# Patient Record
Sex: FEMALE | Race: ASIAN | Employment: UNEMPLOYED | URBAN - METROPOLITAN AREA
[De-identification: names, ages, dates, MRNs, and addresses within clinical notes are randomized per-mention and may not be internally consistent; named-entity substitution may affect disease eponyms.]

---

## 2019-01-07 ENCOUNTER — HOSPITAL ENCOUNTER (EMERGENCY)
Age: 34
Discharge: HOME | End: 2019-01-07
Attending: EMERGENCY MEDICINE
Payer: MEDICAID

## 2019-01-07 VITALS
HEIGHT: 65 IN | WEIGHT: 140 LBS | RESPIRATION RATE: 16 BRPM | DIASTOLIC BLOOD PRESSURE: 74 MMHG | SYSTOLIC BLOOD PRESSURE: 111 MMHG | HEART RATE: 65 BPM | TEMPERATURE: 97.9 F | BODY MASS INDEX: 23.32 KG/M2 | OXYGEN SATURATION: 100 %

## 2019-01-07 DIAGNOSIS — H66.90 ACUTE OTITIS MEDIA, UNSPECIFIED OTITIS MEDIA TYPE: ICD-10-CM

## 2019-01-07 DIAGNOSIS — H57.89 EYE SWELLING: ICD-10-CM

## 2019-01-07 DIAGNOSIS — H00.011 HORDEOLUM EXTERNUM OF RIGHT UPPER EYELID: Primary | ICD-10-CM

## 2019-01-07 PROCEDURE — 99282 EMERGENCY DEPT VISIT SF MDM: CPT

## 2019-01-07 RX ORDER — AZITHROMYCIN 250 MG/1
TABLET, FILM COATED ORAL
Qty: 6 TAB | Refills: 0 | Status: SHIPPED | OUTPATIENT
Start: 2019-01-07 | End: 2019-01-12

## 2019-01-07 RX ORDER — ERYTHROMYCIN 5 MG/G
OINTMENT OPHTHALMIC
Qty: 1 TUBE | Refills: 0 | Status: SHIPPED | OUTPATIENT
Start: 2019-01-07 | End: 2019-01-14

## 2019-01-07 NOTE — ED PROVIDER NOTES
EMERGENCY DEPARTMENT HISTORY AND PHYSICAL EXAM 
 
Date: 1/7/2019 Patient Name: Oli Peacock History of Presenting Illness Chief Complaint Patient presents with  Eye Swelling History Provided By: Patient and Patient's  Chief Complaint: eyelid swelling Duration: 1 Days Timing:  Acute Location: right, upper Modifying Factors: z-pack Associated Symptoms: ear pain Additional History (Context):  
10:57 AM 
Oli Peacock is a 34 y.o. female who presents to the emergency department C/O right upper eyelid swelling onset 1 day ago. Associated symptoms include ear pain. Used daughter's left over z-pack. Denies contact usage. Recent cold symptoms including cough last week; sx resolved. Sick contact with  diagnosed with bronchitis. Pt denies eye drainage, visual disturbance, fever, and any other Sx or complaints. PCP: Julius Garcia MD 
 
 
 
Past History Past Medical History: 
Past Medical History:  
Diagnosis Date  Asthma Past Surgical History: 
History reviewed. No pertinent surgical history. Family History: 
History reviewed. No pertinent family history. Social History: 
Social History Tobacco Use  Smoking status: Not on file Substance Use Topics  Alcohol use: Not on file  Drug use: Not on file Allergies: Allergies Allergen Reactions  Motrin [Ibuprofen] Anaphylaxis Review of Systems Review of Systems Constitutional: Negative for fever. HENT: Positive for ear pain. Eyes: Negative for discharge and visual disturbance. (+)  right upper eyelid swelling All other systems reviewed and are negative. Physical Exam  
 
Vitals:  
 01/07/19 1055 BP: 111/74 Pulse: 65 Resp: 16 Temp: 97.9 °F (36.6 °C) SpO2: 100% Weight: 63.5 kg (140 lb) Height: 5' 5\" (1.651 m) Physical Exam  
Constitutional: She appears well-developed and well-nourished.   
HENT:  
 Head: Normocephalic and atraumatic. Right Ear: Hearing normal.  
Left Ear: Hearing and tympanic membrane normal.  
Ears: 
 
Nose: Nose normal.  
Mouth/Throat:  
 
 
Eyes: Conjunctivae and EOM are normal. Pupils are equal, round, and reactive to light.  
hordeolum and mild swelling to the right upper eyelid, no ocular involvement, mild TTP, no surrounding swelling or erythema Neck: Neck supple. Nursing note and vitals reviewed. Diagnostic Study Results Labs - No results found for this or any previous visit (from the past 12 hour(s)). Radiologic Studies - No orders to display CT Results  (Last 48 hours) None CXR Results  (Last 48 hours) None Medications given in the ED- Medications - No data to display Medical Decision Making I am the first provider for this patient. I reviewed the vital signs, available nursing notes, past medical history, past surgical history, family history and social history. Vital Signs-Reviewed the patient's vital signs. Pulse Oximetry Analysis - 100% on RA Records Reviewed: Nursing Notes and Old Medical Records Provider Notes (Medical Decision Making): Patient presents with eye swelling and irritation. Obvious hordeolum without findings of cellulitis. She has otitis media on exam as well. Will treat with erythromycin drops and zpack. She was assisted by  with  Follow up, understands reasons to return and is offering no questions or concerns Procedures: 
Procedures ED Course:  
10:57 AM Initial assessment performed. The patients presenting problems have been discussed, and they are in agreement with the care plan formulated and outlined with them. I have encouraged them to ask questions as they arise throughout their visit. Diagnosis and Disposition DISCHARGE NOTE: 
11:12 AM 
Chela Veliz's  results have been reviewed with her.   She has been counseled regarding her diagnosis, treatment, and plan. She verbally conveys understanding and agreement of the signs, symptoms, diagnosis, treatment and prognosis and additionally agrees to follow up as discussed. She also agrees with the care-plan and conveys that all of her questions have been answered. I have also provided discharge instructions for her that include: educational information regarding their diagnosis and treatment, and list of reasons why they would want to return to the ED prior to their follow-up appointment, should her condition change. She has been provided with education for proper emergency department utilization. CLINICAL IMPRESSION: 
 
1. Hordeolum externum of right upper eyelid 2. Eye swelling 3. Acute otitis media, unspecified otitis media type PLAN: 
1. D/C Home 2. Current Discharge Medication List  
  
START taking these medications Details  
erythromycin (ILOTYCIN) ophthalmic ointment Apply to the affected eye 4x daily for 7-10 days Qty: 1 Tube, Refills: 0  
  
azithromycin (ZITHROMAX) 250 mg tablet Take as directed Qty: 6 Tab, Refills: 0  
  
  
 
3. Follow-up Information Follow up With Specialties Details Why Contact Info [facility]  Schedule an appointment as soon as possible for a visit For primary care follow up  [address] [place], [address] [place] [address] 
[phone] Ana Maria Holguin MD Ophthalmology Schedule an appointment as soon as possible for a visit For follow up with eye [address] [place] [address] [address] [facility] 
[phone] [address] [place] [address] EMERGENCY DEPT Emergency Medicine Go to As needed, as symptoms worsen [address] [place] [address] 
[phone]  
  
 
_______________________________ Attestations:  
This note is prepared by Angelica Hidalgo, acting as Scribe for Loyde Holter, NP. 
 
 Nadeem Yang NP:  The scribe's documentation has been prepared under my direction and personally reviewed by me in its entirety. I confirm that the note above accurately reflects all work, treatment, procedures, and medical decision making performed by me. 
_______________________________

## 2019-01-07 NOTE — DISCHARGE INSTRUCTIONS
Follow up as directed  Return to the ED for increased pain, swelling, redness, visual change, fever or worsening of symptoms  Use medications as directed        Patient Education        Ear Infection (Otitis Media): Care Instructions  Your Care Instructions    An ear infection may start with a cold and affect the middle ear (otitis media). It can hurt a lot. Most ear infections clear up on their own in a couple of days. Most often you will not need antibiotics. This is because many ear infections are caused by a virus. Antibiotics don't work against a virus. Regular doses of pain medicines are the best way to reduce your fever and help you feel better. Follow-up care is a key part of your treatment and safety. Be sure to make and go to all appointments, and call your doctor if you are having problems. It's also a good idea to know your test results and keep a list of the medicines you take. How can you care for yourself at home? · Take pain medicines exactly as directed. ? If the doctor gave you a prescription medicine for pain, take it as prescribed. ? If you are not taking a prescription pain medicine, take an over-the-counter medicine, such as acetaminophen (Tylenol), ibuprofen (Advil, Motrin), or naproxen (Aleve). Read and follow all instructions on the label. ? Do not take two or more pain medicines at the same time unless the doctor told you to. Many pain medicines have acetaminophen, which is Tylenol. Too much acetaminophen (Tylenol) can be harmful. · Plan to take a full dose of pain reliever before bedtime. Getting enough sleep will help you get better. · Try a warm, moist washcloth on the ear. It may help relieve pain. · If your doctor prescribed antibiotics, take them as directed. Do not stop taking them just because you feel better. You need to take the full course of antibiotics. When should you call for help?   Call your doctor now or seek immediate medical care if:    · You have new or increasing ear pain.     · You have new or increasing pus or blood draining from your ear.     · You have a fever with a stiff neck or a severe headache.    Watch closely for changes in your health, and be sure to contact your doctor if:    · You have new or worse symptoms.     · You are not getting better after taking an antibiotic for 2 days. Where can you learn more? Go to http://manpreet-adriana.info/. Enter D523 in the search box to learn more about \"Ear Infection (Otitis Media): Care Instructions. \"  Current as of: March 28, 2018  Content Version: 11.8  © 2561-2532 [employer]. Care instructions adapted under license by [employer] (which disclaims liability or warranty for this information). If you have questions about a medical condition or this instruction, always ask your healthcare professional. [address] any warranty or liability for your use of this information. Patient Education        Styes and Chalazia: Care Instructions  Your Care Instructions    Styes and chalazia (say \"jqb-CDW-apc-\") are both conditions that can cause swelling of the eyelid. A stye is an infection in the root of an eyelash. The infection causes a tender red lump on the edge of the eyelid. The infection can spread until the whole eyelid becomes red and inflamed. Styes usually break open, and a tiny amount of pus drains. They usually clear up on their own in about a week, but they sometimes need treatment with antibiotics. A chalazion is a lump or cyst in the eyelid (chalazion is singular; chalazia is plural). It is caused by swelling and inflammation of deep oil glands inside the eyelid. Chalazia are usually not infected. They can take a few months to heal.  If a chalazion becomes more swollen and painful or does not go away, you may need to have it drained by your doctor. Follow-up care is a key part of your treatment and safety.  Be sure to make and go to all appointments, and call your doctor if you are having problems. It's also a good idea to know your test results and keep a list of the medicines you take. How can you care for yourself at home? · Do not rub your eyes. Do not squeeze or try to open a stye or chalazion. · To help a stye or chalazion heal faster:  ? Put a warm, moist compress on your eye for 5 to 10 minutes, 3 to 6 times a day. Heat often brings a stye to a point where it drains on its own. Keep in mind that warm compresses will often increase swelling a little at first.  ? Do not use hot water or heat a wet cloth in a microwave oven. The compress may get too hot and can burn the eyelid. · Always wash your hands before and after you use a compress or touch your eyes. · If the doctor gave you antibiotic drops or ointment, use the medicine exactly as directed. Use the medicine for as long as instructed, even if your eye starts to feel better. · To put in eyedrops or ointment:  ? Tilt your head back, and pull your lower eyelid down with one finger. ? Drop or squirt the medicine inside the lower lid. ? Close your eye for 30 to 60 seconds to let the drops or ointment move around. ? Do not touch the ointment or dropper tip to your eyelashes or any other surface. · Do not wear eye makeup or contact lenses until the stye or chalazion heals. · Do not share towels, pillows, or washcloths while you have a stye. When should you call for help? Call your doctor now or seek immediate medical care if:    · You have pain in your eye.     · You have a change in vision or loss of vision.     · Redness and swelling get much worse.    Watch closely for changes in your health, and be sure to contact your doctor if:    · Your stye does not get better in 1 week.     · Your chalazion does not start to get better after several weeks. Where can you learn more? Go to http://manpreet-adriana.info/.   Enter I441 in the search box to learn more about \"Styes and Chalazia: Care Instructions. \"  Current as of: December 3, 2017  Content Version: 11.8  © 7699-6583 [employer]. Care instructions adapted under license by [employer] (which disclaims liability or warranty for this information). If you have questions about a medical condition or this instruction, always ask your healthcare professional. [address] any warranty or liability for your use of this information.

## 2020-11-21 ENCOUNTER — HOSPITAL ENCOUNTER (INPATIENT)
Age: 35
LOS: 1 days | Discharge: HOME | DRG: 560 | End: 2020-11-22
Attending: OBSTETRICS & GYNECOLOGY | Admitting: OBSTETRICS & GYNECOLOGY
Payer: MEDICAID

## 2020-11-21 ENCOUNTER — ANESTHESIA EVENT (OUTPATIENT)
Dept: LABOR AND DELIVERY | Age: 35
DRG: 560 | End: 2020-11-21
Payer: MEDICAID

## 2020-11-21 ENCOUNTER — ANESTHESIA (OUTPATIENT)
Dept: LABOR AND DELIVERY | Age: 35
DRG: 560 | End: 2020-11-21
Payer: MEDICAID

## 2020-11-21 PROBLEM — O47.9 UTERINE CONTRACTIONS DURING PREGNANCY: Status: ACTIVE | Noted: 2020-11-21

## 2020-11-21 LAB
ABO + RH BLD: NORMAL
BASOPHILS # BLD: 0 K/UL (ref 0–0.1)
BASOPHILS NFR BLD: 0 % (ref 0–2)
BLOOD GROUP ANTIBODIES SERPL: NORMAL
DIFFERENTIAL METHOD BLD: ABNORMAL
EOSINOPHIL # BLD: 0 K/UL (ref 0–0.4)
EOSINOPHIL NFR BLD: 0 % (ref 0–5)
ERYTHROCYTE [DISTWIDTH] IN BLOOD BY AUTOMATED COUNT: 19.6 % (ref 11.6–14.5)
HCT VFR BLD AUTO: 36.6 % (ref 35–45)
HGB BLD-MCNC: 12 G/DL (ref 12–16)
LYMPHOCYTES # BLD: 1.8 K/UL (ref 0.9–3.6)
LYMPHOCYTES NFR BLD: 16 % (ref 21–52)
MCH RBC QN AUTO: 29.1 PG (ref 24–34)
MCHC RBC AUTO-ENTMCNC: 32.8 G/DL (ref 31–37)
MCV RBC AUTO: 88.6 FL (ref 74–97)
MONOCYTES # BLD: 0.5 K/UL (ref 0.05–1.2)
MONOCYTES NFR BLD: 5 % (ref 3–10)
NEUTS SEG # BLD: 8.4 K/UL (ref 1.8–8)
NEUTS SEG NFR BLD: 79 % (ref 40–73)
PLATELET # BLD AUTO: 329 K/UL (ref 135–420)
PMV BLD AUTO: 10.2 FL (ref 9.2–11.8)
RBC # BLD AUTO: 4.13 M/UL (ref 4.2–5.3)
SPECIMEN EXP DATE BLD: NORMAL
WBC # BLD AUTO: 10.7 K/UL (ref 4.6–13.2)

## 2020-11-21 PROCEDURE — 74011250636 HC RX REV CODE- 250/636: Performed by: OBSTETRICS & GYNECOLOGY

## 2020-11-21 PROCEDURE — 10907ZC DRAINAGE OF AMNIOTIC FLUID, THERAPEUTIC FROM PRODUCTS OF CONCEPTION, VIA NATURAL OR ARTIFICIAL OPENING: ICD-10-PCS | Performed by: OBSTETRICS & GYNECOLOGY

## 2020-11-21 PROCEDURE — 74011000250 HC RX REV CODE- 250: Performed by: ANESTHESIOLOGY

## 2020-11-21 PROCEDURE — 76060000078 HC EPIDURAL ANESTHESIA

## 2020-11-21 PROCEDURE — 74011250636 HC RX REV CODE- 250/636: Performed by: ANESTHESIOLOGY

## 2020-11-21 PROCEDURE — 75410000002 HC LABOR FEE PER 1 HR

## 2020-11-21 PROCEDURE — 77030007879 HC KT SPN EPDRL TELE -B: Performed by: ANESTHESIOLOGY

## 2020-11-21 PROCEDURE — 65270000029 HC RM PRIVATE

## 2020-11-21 PROCEDURE — 75410000003 HC RECOV DEL/VAG/CSECN EA 0.5 HR

## 2020-11-21 PROCEDURE — 75410000000 HC DELIVERY VAGINAL/SINGLE

## 2020-11-21 PROCEDURE — 86900 BLOOD TYPING SEROLOGIC ABO: CPT

## 2020-11-21 PROCEDURE — 3E033VJ INTRODUCTION OF OTHER HORMONE INTO PERIPHERAL VEIN, PERCUTANEOUS APPROACH: ICD-10-PCS | Performed by: OBSTETRICS & GYNECOLOGY

## 2020-11-21 PROCEDURE — 74011250637 HC RX REV CODE- 250/637: Performed by: ANESTHESIOLOGY

## 2020-11-21 PROCEDURE — 74011000250 HC RX REV CODE- 250

## 2020-11-21 PROCEDURE — 74011250636 HC RX REV CODE- 250/636

## 2020-11-21 PROCEDURE — 85025 COMPLETE CBC W/AUTO DIFF WBC: CPT

## 2020-11-21 PROCEDURE — 74011250637 HC RX REV CODE- 250/637: Performed by: ADVANCED PRACTICE MIDWIFE

## 2020-11-21 PROCEDURE — 0KQM0ZZ REPAIR PERINEUM MUSCLE, OPEN APPROACH: ICD-10-PCS | Performed by: OBSTETRICS & GYNECOLOGY

## 2020-11-21 RX ORDER — ZOLPIDEM TARTRATE 5 MG/1
5 TABLET ORAL
Status: DISCONTINUED | OUTPATIENT
Start: 2020-11-21 | End: 2020-11-22 | Stop reason: HOSPADM

## 2020-11-21 RX ORDER — SODIUM CHLORIDE 0.9 % (FLUSH) 0.9 %
5-40 SYRINGE (ML) INJECTION EVERY 8 HOURS
Status: DISCONTINUED | OUTPATIENT
Start: 2020-11-21 | End: 2020-11-22 | Stop reason: HOSPADM

## 2020-11-21 RX ORDER — IBUPROFEN 400 MG/1
800 TABLET ORAL
Status: DISCONTINUED | OUTPATIENT
Start: 2020-11-21 | End: 2020-11-21

## 2020-11-21 RX ORDER — ACETAMINOPHEN 325 MG/1
650 TABLET ORAL
Status: DISCONTINUED | OUTPATIENT
Start: 2020-11-21 | End: 2020-11-22 | Stop reason: HOSPADM

## 2020-11-21 RX ORDER — NALBUPHINE HYDROCHLORIDE 10 MG/ML
10 INJECTION, SOLUTION INTRAMUSCULAR; INTRAVENOUS; SUBCUTANEOUS
Status: DISCONTINUED | OUTPATIENT
Start: 2020-11-21 | End: 2020-11-21 | Stop reason: HOSPADM

## 2020-11-21 RX ORDER — LIDOCAINE HYDROCHLORIDE AND EPINEPHRINE 15; 5 MG/ML; UG/ML
INJECTION, SOLUTION EPIDURAL AS NEEDED
Status: DISCONTINUED | OUTPATIENT
Start: 2020-11-21 | End: 2020-11-21 | Stop reason: HOSPADM

## 2020-11-21 RX ORDER — MINERAL OIL
30 OIL (ML) ORAL AS NEEDED
Status: DISCONTINUED | OUTPATIENT
Start: 2020-11-21 | End: 2020-11-21 | Stop reason: HOSPADM

## 2020-11-21 RX ORDER — FENTANYL CITRATE 50 UG/ML
INJECTION, SOLUTION INTRAMUSCULAR; INTRAVENOUS
Status: COMPLETED
Start: 2020-11-21 | End: 2020-11-21

## 2020-11-21 RX ORDER — HYDROCODONE BITARTRATE AND ACETAMINOPHEN 5; 325 MG/1; MG/1
1 TABLET ORAL AS NEEDED
Status: DISCONTINUED | OUTPATIENT
Start: 2020-11-21 | End: 2020-11-22 | Stop reason: HOSPADM

## 2020-11-21 RX ORDER — SODIUM CHLORIDE 0.9 % (FLUSH) 0.9 %
5-40 SYRINGE (ML) INJECTION AS NEEDED
Status: DISCONTINUED | OUTPATIENT
Start: 2020-11-21 | End: 2020-11-22 | Stop reason: HOSPADM

## 2020-11-21 RX ORDER — NALBUPHINE HYDROCHLORIDE 10 MG/ML
2.5 INJECTION, SOLUTION INTRAMUSCULAR; INTRAVENOUS; SUBCUTANEOUS
Status: DISCONTINUED | OUTPATIENT
Start: 2020-11-21 | End: 2020-11-21 | Stop reason: HOSPADM

## 2020-11-21 RX ORDER — LIDOCAINE HYDROCHLORIDE 10 MG/ML
20 INJECTION, SOLUTION EPIDURAL; INFILTRATION; INTRACAUDAL; PERINEURAL AS NEEDED
Status: DISCONTINUED | OUTPATIENT
Start: 2020-11-21 | End: 2020-11-21 | Stop reason: HOSPADM

## 2020-11-21 RX ORDER — NALOXONE HYDROCHLORIDE 0.4 MG/ML
0.1 INJECTION, SOLUTION INTRAMUSCULAR; INTRAVENOUS; SUBCUTANEOUS AS NEEDED
Status: DISCONTINUED | OUTPATIENT
Start: 2020-11-21 | End: 2020-11-22 | Stop reason: HOSPADM

## 2020-11-21 RX ORDER — METHYLERGONOVINE MALEATE 0.2 MG/ML
0.2 INJECTION INTRAVENOUS AS NEEDED
Status: DISCONTINUED | OUTPATIENT
Start: 2020-11-21 | End: 2020-11-21 | Stop reason: HOSPADM

## 2020-11-21 RX ORDER — OXYTOCIN/0.9 % SODIUM CHLORIDE 30/500 ML
95 PLASTIC BAG, INJECTION (ML) INTRAVENOUS AS NEEDED
Status: DISCONTINUED | OUTPATIENT
Start: 2020-11-21 | End: 2020-11-21 | Stop reason: HOSPADM

## 2020-11-21 RX ORDER — PROMETHAZINE HYDROCHLORIDE 25 MG/ML
25 INJECTION, SOLUTION INTRAMUSCULAR; INTRAVENOUS
Status: DISCONTINUED | OUTPATIENT
Start: 2020-11-21 | End: 2020-11-22 | Stop reason: HOSPADM

## 2020-11-21 RX ORDER — PHENYLEPHRINE HCL IN 0.9% NACL 1 MG/10 ML
80 SYRINGE (ML) INTRAVENOUS AS NEEDED
Status: DISCONTINUED | OUTPATIENT
Start: 2020-11-21 | End: 2020-11-21 | Stop reason: HOSPADM

## 2020-11-21 RX ORDER — ALBUTEROL SULFATE 0.83 MG/ML
2.5 SOLUTION RESPIRATORY (INHALATION) AS NEEDED
Status: DISCONTINUED | OUTPATIENT
Start: 2020-11-21 | End: 2020-11-22 | Stop reason: HOSPADM

## 2020-11-21 RX ORDER — SODIUM CHLORIDE 0.9 % (FLUSH) 0.9 %
5-40 SYRINGE (ML) INJECTION AS NEEDED
Status: DISCONTINUED | OUTPATIENT
Start: 2020-11-21 | End: 2020-11-21 | Stop reason: HOSPADM

## 2020-11-21 RX ORDER — SODIUM CHLORIDE 0.9 % (FLUSH) 0.9 %
5-40 SYRINGE (ML) INJECTION EVERY 8 HOURS
Status: DISCONTINUED | OUTPATIENT
Start: 2020-11-21 | End: 2020-11-21 | Stop reason: HOSPADM

## 2020-11-21 RX ORDER — ONDANSETRON 2 MG/ML
4 INJECTION INTRAMUSCULAR; INTRAVENOUS
Status: DISCONTINUED | OUTPATIENT
Start: 2020-11-21 | End: 2020-11-21 | Stop reason: HOSPADM

## 2020-11-21 RX ORDER — BUTORPHANOL TARTRATE 2 MG/ML
2 INJECTION INTRAMUSCULAR; INTRAVENOUS
Status: DISCONTINUED | OUTPATIENT
Start: 2020-11-21 | End: 2020-11-21 | Stop reason: HOSPADM

## 2020-11-21 RX ORDER — UREA 10 %
100 LOTION (ML) TOPICAL DAILY
COMMUNITY

## 2020-11-21 RX ORDER — FENTANYL/ROPIVACAINE/NS/PF 2MCG/ML-.1
PLASTIC BAG, INJECTION (ML) EPIDURAL
Status: COMPLETED
Start: 2020-11-21 | End: 2020-11-21

## 2020-11-21 RX ORDER — SODIUM CHLORIDE, SODIUM LACTATE, POTASSIUM CHLORIDE, CALCIUM CHLORIDE 600; 310; 30; 20 MG/100ML; MG/100ML; MG/100ML; MG/100ML
125 INJECTION, SOLUTION INTRAVENOUS CONTINUOUS
Status: DISCONTINUED | OUTPATIENT
Start: 2020-11-21 | End: 2020-11-21 | Stop reason: HOSPADM

## 2020-11-21 RX ORDER — DIPHENHYDRAMINE HYDROCHLORIDE 50 MG/ML
12.5 INJECTION, SOLUTION INTRAMUSCULAR; INTRAVENOUS
Status: DISCONTINUED | OUTPATIENT
Start: 2020-11-21 | End: 2020-11-22 | Stop reason: HOSPADM

## 2020-11-21 RX ORDER — ONDANSETRON 2 MG/ML
4 INJECTION INTRAMUSCULAR; INTRAVENOUS ONCE
Status: ACTIVE | OUTPATIENT
Start: 2020-11-21 | End: 2020-11-22

## 2020-11-21 RX ORDER — FENTANYL CITRATE 50 UG/ML
100 INJECTION, SOLUTION INTRAMUSCULAR; INTRAVENOUS ONCE
Status: COMPLETED | OUTPATIENT
Start: 2020-11-21 | End: 2020-11-21

## 2020-11-21 RX ORDER — FENTANYL CITRATE 50 UG/ML
25 INJECTION, SOLUTION INTRAMUSCULAR; INTRAVENOUS
Status: DISCONTINUED | OUTPATIENT
Start: 2020-11-21 | End: 2020-11-22 | Stop reason: HOSPADM

## 2020-11-21 RX ORDER — SODIUM CHLORIDE, SODIUM LACTATE, POTASSIUM CHLORIDE, CALCIUM CHLORIDE 600; 310; 30; 20 MG/100ML; MG/100ML; MG/100ML; MG/100ML
150 INJECTION, SOLUTION INTRAVENOUS CONTINUOUS
Status: DISCONTINUED | OUTPATIENT
Start: 2020-11-21 | End: 2020-11-22 | Stop reason: HOSPADM

## 2020-11-21 RX ORDER — AMOXICILLIN 250 MG
1 CAPSULE ORAL
Status: DISCONTINUED | OUTPATIENT
Start: 2020-11-21 | End: 2020-11-22 | Stop reason: HOSPADM

## 2020-11-21 RX ORDER — NALOXONE HYDROCHLORIDE 0.4 MG/ML
0.2 INJECTION, SOLUTION INTRAMUSCULAR; INTRAVENOUS; SUBCUTANEOUS AS NEEDED
Status: DISCONTINUED | OUTPATIENT
Start: 2020-11-21 | End: 2020-11-21 | Stop reason: HOSPADM

## 2020-11-21 RX ORDER — HYDROMORPHONE HYDROCHLORIDE 1 MG/ML
1 INJECTION, SOLUTION INTRAMUSCULAR; INTRAVENOUS; SUBCUTANEOUS
Status: DISCONTINUED | OUTPATIENT
Start: 2020-11-21 | End: 2020-11-21 | Stop reason: HOSPADM

## 2020-11-21 RX ORDER — FENTANYL/ROPIVACAINE/NS/PF 2MCG/ML-.1
1-22 PLASTIC BAG, INJECTION (ML) EPIDURAL
Status: DISCONTINUED | OUTPATIENT
Start: 2020-11-21 | End: 2020-11-21 | Stop reason: HOSPADM

## 2020-11-21 RX ORDER — OXYCODONE AND ACETAMINOPHEN 5; 325 MG/1; MG/1
2 TABLET ORAL
Status: DISCONTINUED | OUTPATIENT
Start: 2020-11-21 | End: 2020-11-22 | Stop reason: HOSPADM

## 2020-11-21 RX ORDER — TERBUTALINE SULFATE 1 MG/ML
0.25 INJECTION SUBCUTANEOUS
Status: DISCONTINUED | OUTPATIENT
Start: 2020-11-21 | End: 2020-11-21 | Stop reason: HOSPADM

## 2020-11-21 RX ORDER — MAGNESIUM SULFATE 100 %
4 CRYSTALS MISCELLANEOUS AS NEEDED
Status: DISCONTINUED | OUTPATIENT
Start: 2020-11-21 | End: 2020-11-21 | Stop reason: CLARIF

## 2020-11-21 RX ORDER — DEXTROSE MONOHYDRATE 100 MG/ML
125-250 INJECTION, SOLUTION INTRAVENOUS AS NEEDED
Status: DISCONTINUED | OUTPATIENT
Start: 2020-11-21 | End: 2020-11-21 | Stop reason: CLARIF

## 2020-11-21 RX ORDER — INSULIN LISPRO 100 [IU]/ML
INJECTION, SOLUTION INTRAVENOUS; SUBCUTANEOUS ONCE
Status: DISCONTINUED | OUTPATIENT
Start: 2020-11-21 | End: 2020-11-21 | Stop reason: CLARIF

## 2020-11-21 RX ORDER — FENTANYL CITRATE 50 UG/ML
INJECTION, SOLUTION INTRAMUSCULAR; INTRAVENOUS AS NEEDED
Status: DISCONTINUED | OUTPATIENT
Start: 2020-11-21 | End: 2020-11-21 | Stop reason: HOSPADM

## 2020-11-21 RX ORDER — DIPHENHYDRAMINE HYDROCHLORIDE 50 MG/ML
25 INJECTION, SOLUTION INTRAMUSCULAR; INTRAVENOUS
Status: DISCONTINUED | OUTPATIENT
Start: 2020-11-21 | End: 2020-11-21 | Stop reason: HOSPADM

## 2020-11-21 RX ORDER — OXYTOCIN/RINGER'S LACTATE 30/500 ML
10 PLASTIC BAG, INJECTION (ML) INTRAVENOUS AS NEEDED
Status: COMPLETED | OUTPATIENT
Start: 2020-11-21 | End: 2020-11-21

## 2020-11-21 RX ORDER — HYDROMORPHONE HYDROCHLORIDE 1 MG/ML
0.2 INJECTION, SOLUTION INTRAMUSCULAR; INTRAVENOUS; SUBCUTANEOUS AS NEEDED
Status: DISCONTINUED | OUTPATIENT
Start: 2020-11-21 | End: 2020-11-22 | Stop reason: HOSPADM

## 2020-11-21 RX ADMIN — Medication 10000 MILLI-UNITS: at 11:51

## 2020-11-21 RX ADMIN — LIDOCAINE HYDROCHLORIDE,EPINEPHRINE BITARTRATE 5 ML: 15; .005 INJECTION, SOLUTION EPIDURAL; INFILTRATION; INTRACAUDAL; PERINEURAL at 10:35

## 2020-11-21 RX ADMIN — SODIUM CHLORIDE, SODIUM LACTATE, POTASSIUM CHLORIDE, AND CALCIUM CHLORIDE 1000 ML: 600; 310; 30; 20 INJECTION, SOLUTION INTRAVENOUS at 10:15

## 2020-11-21 RX ADMIN — Medication 15 ML/HR: at 10:54

## 2020-11-21 RX ADMIN — FENTANYL CITRATE 100 MCG: 50 INJECTION, SOLUTION INTRAMUSCULAR; INTRAVENOUS at 10:30

## 2020-11-21 RX ADMIN — SODIUM CHLORIDE, SODIUM LACTATE, POTASSIUM CHLORIDE, AND CALCIUM CHLORIDE 125 ML/HR: 600; 310; 30; 20 INJECTION, SOLUTION INTRAVENOUS at 10:47

## 2020-11-21 RX ADMIN — FENTANYL CITRATE 100 MCG: 50 INJECTION, SOLUTION INTRAMUSCULAR; INTRAVENOUS at 10:36

## 2020-11-21 RX ADMIN — OXYCODONE HYDROCHLORIDE AND ACETAMINOPHEN 2 TABLET: 5; 325 TABLET ORAL at 19:58

## 2020-11-21 NOTE — L&D DELIVERY NOTE
Patient: Abby Womack                   Delivery Summary    Circumcision:   NA-female    Summoned to room for patient feeling urge to push. Patient complete. AROM performed and CNM present for entire length of pushing. With strong maternal effort, infant head delivered ANDRIA, restituted. Loose nuchal x1 noted and easy reduced. Left anterior shoulder delivered with ease and vigorous, female infant was placed immediately on maternal abdomen and was dried and stimulated. Once cord stopped pulsating, cord was double clamped and family member cut cord in between the clamps. Cord blood collected. IV postpartum pitocin bolus initiated. Placenta delivered spontaneously, yanick, intact with 3 vessel cord. Bleeding well controlled with fundus firm, midline and 1 below umbilicus. Maternal and infant VSS. Will begin routine postpartum and  care. /     Information for the patient's :  Sisto Camera [[id number]]     Delivery Type: Vaginal, Spontaneous   Delivery Date: 2020   Delivery Time: 11:47 AM     Birth Weight:       Sex:  female  Delivery Clinician:  Elisabeth Bedoya   Gestational Age: 43w3d    Presentation: Vertex   Position: Right  Occiput  Anterior     Apgars were 8  and 9      Resuscitation Method: Suctioning-bulb; Tactile Stimulation     Meconium Stained: None    Living Status: Living       Placenta Date/Time: 2020 11:54 AM   Placenta Removal: Spontaneous   Placenta Appearance: Intact    Cord Information: 3 Vessels    Cord Events: Nuchal Cord Without Compressions       Disposition of Cord Blood: Lab    Blood Gases Sent?:  Yes       Cord pH:  none    Episiotomy: None  Laceration(s): 2nd    Estimated Blood Loss (ml): 350    Labor Events  Method:      Augmentation:   Cervical Ripening:        Induction - no    Induction Indication - N/A    Induction Method - N/A    Complications - none    NICU Admit - no    HTN Disorders - none    Diabetes - N/A    Operative Vaginal Delivery - none    Additional Delivery Comments - Delivery Summary    Patient: Dylan Gao MRN: [id number]  SSN: [id number]    YOB: 1985  Age: 37 y.o. Sex: female       Information for the patient's :  Bridgett Veliz [[id number]]       Labor Events:    Labor: No    Steroids: None   Cervical Ripening Date/Time:       Cervical Ripening Type: None   Antibiotics During Labor: No   Rupture Identifier:      Rupture Date/Time: 2020 11:42 AM   Rupture Type: AROM   Amniotic Fluid Volume: Moderate    Amniotic Fluid Description: Clear    Amniotic Fluid Odor: None    Induction: None       Induction Date/Time:        Indications for Induction:      Augmentation: None   Augmentation Date/Time:      Indications for Augmentation:     Labor complications: None       Additional complications:        Delivery Events:  Indications For Episiotomy:     Episiotomy: None   Perineal Laceration(s): 2nd   Repaired: Yes   Periurethral Laceration Location:      Repaired:     Labial Laceration Location:     Repaired:     Sulcal Laceration Location:     Repaired:     Vaginal Laceration Location:     Repaired:     Cervical Laceration Location:     Repaired:     Repair Suture: Vicryl 2-0   Number of Repair Packets: 1   Estimated Blood Loss (ml): 350ml   Quantitative Blood Loss (ml)                Delivery Date: 2020    Delivery Time: 11:47 AM  Delivery Type: Vaginal, Spontaneous  Sex:  Female    Gestational Age: 43w3d   Delivery Clinician:  Power Betts  Living Status: Living   Delivery Location: L&D 4          APGARS  One minute Five minutes Ten minutes   Skin color: 1   1        Heart rate: 2   2        Grimace: 2   2        Muscle tone: 2   2        Breathin   2        Totals: 8   9            Presentation: Vertex    Position: Right Occiput Anterior  Resuscitation Method:  Suctioning-bulb; Tactile Stimulation     Meconium Stained: None      Cord Information: 3 Vessels  Complications: Nuchal Cord Without Compressions  Cord around: head  Delayed cord clamping? Yes  Cord clamped date/time:2020 11:49 AM  Disposition of Cord Blood: Lab    Blood Gases Sent?: Yes    Placenta:  Date/Time: 2020 11:54 AM  Removal: Spontaneous      Appearance: Intact     Micro Measurements:  Birth Weight:        Birth Length:        Head Circumference:        Chest Circumference:       Abdominal Girth: Other Providers:   Paul GR;AMBAR AUGUSTINE;MIMI LEPE;;;;;;NORBERT JO, Obstetrician;Primary Nurse;Primary  Nurse;Nicu Nurse;Neonatologist;Anesthesiologist;Crna;Nurse Practitioner;Midwife;Nursery Nurse           Group B Strep: No results found for: GRBSEXT, GRBSEXT  Information for the patient's :  Luca Sanchez [[id number]]   No results found for: ABORH, PCTABR, PCTDIG, BILI, ABORHEXT, ABORH     No results for input(s): PCO2CB, PO2CB, HCO3I, SO2I, IBD, PTEMPI, SPECTI, PHICB, ISITE, IDEV, IALLEN in the last 72 hours.

## 2020-11-21 NOTE — H&P
Nursery  Record    Subjective:     Madhuri Grove is a female infant born on 1985 at  . She weighed   and measured   in length. Apgars were  and . Maternal Data:     Delivery Type:    Delivery Resuscitation: no  Number of Vessels:  3  Cord Events: no  Meconium Stained:  no    This patient's mother is not on file. Objective:     Visit Vitals  /70   Pulse 69   Temp 98.2 °F (36.8 °C)   Resp 16   Ht 5' 5\" (1.651 m)   Wt 70.3 kg (155 lb)   BMI 25.79 kg/m²       Results for orders placed or performed during the hospital encounter of 20   CBC WITH AUTOMATED DIFF   Result Value Ref Range    WBC 10.7 4.6 - 13.2 K/uL    RBC 4.13 (L) 4.20 - 5.30 M/uL    HGB 12.0 12.0 - 16.0 g/dL    HCT 36.6 35.0 - 45.0 %    MCV 88.6 74.0 - 97.0 FL    MCH 29.1 24.0 - 34.0 PG    MCHC 32.8 31.0 - 37.0 g/dL    RDW 19.6 (H) 11.6 - 14.5 %    PLATELET 480 978 - 625 K/uL    MPV 10.2 9.2 - 11.8 FL    NEUTROPHILS 79 (H) 40 - 73 %    LYMPHOCYTES 16 (L) 21 - 52 %    MONOCYTES 5 3 - 10 %    EOSINOPHILS 0 0 - 5 %    BASOPHILS 0 0 - 2 %    ABS. NEUTROPHILS 8.4 (H) 1.8 - 8.0 K/UL    ABS. LYMPHOCYTES 1.8 0.9 - 3.6 K/UL    ABS. MONOCYTES 0.5 0.05 - 1.2 K/UL    ABS. EOSINOPHILS 0.0 0.0 - 0.4 K/UL    ABS.  BASOPHILS 0.0 0.0 - 0.1 K/UL    DF AUTOMATED     TYPE & SCREEN   Result Value Ref Range    Crossmatch Expiration 2020,2359     ABO/Rh(D) O POSITIVE     Antibody screen NEG       Recent Results (from the past 24 hour(s))   CBC WITH AUTOMATED DIFF    Collection Time: 20  9:40 AM   Result Value Ref Range    WBC 10.7 4.6 - 13.2 K/uL    RBC 4.13 (L) 4.20 - 5.30 M/uL    HGB 12.0 12.0 - 16.0 g/dL    HCT 36.6 35.0 - 45.0 %    MCV 88.6 74.0 - 97.0 FL    MCH 29.1 24.0 - 34.0 PG    MCHC 32.8 31.0 - 37.0 g/dL    RDW 19.6 (H) 11.6 - 14.5 %    PLATELET 412 578 - 006 K/uL    MPV 10.2 9.2 - 11.8 FL    NEUTROPHILS 79 (H) 40 - 73 %    LYMPHOCYTES 16 (L) 21 - 52 %    MONOCYTES 5 3 - 10 %    EOSINOPHILS 0 0 - 5 % BASOPHILS 0 0 - 2 %    ABS. NEUTROPHILS 8.4 (H) 1.8 - 8.0 K/UL    ABS. LYMPHOCYTES 1.8 0.9 - 3.6 K/UL    ABS. MONOCYTES 0.5 0.05 - 1.2 K/UL    ABS. EOSINOPHILS 0.0 0.0 - 0.4 K/UL    ABS. BASOPHILS 0.0 0.0 - 0.1 K/UL    DF AUTOMATED     TYPE & SCREEN    Collection Time: 20  9:40 AM   Result Value Ref Range    Crossmatch Expiration 2020,2359     ABO/Rh(D) O POSITIVE     Antibody screen NEG        Physical Exam:    Code for table:  O No abnormality  X Abnormally (describe abnormal findings) Admission Exam  CODE Admission Exam  Description of  Findings DischargeExam  CODE Discharge Exam  Description of  Findings   General Appearance 0 AGA, NAD     Skin 0 acrocyanosis     Head, Neck 0 AF flat open     Eyes 0 LR deferred X2     Ears, Nose, & Throat 0 Nares patent, no clefts     Thorax 0      Lungs 0 clear     Heart 0 No M, pos fem pulses     Abdomen 0 3VC     Genitalia 0 Female     Anus 0      Trunk and Spine 0      Extremities 0 10/10, no hip clunks     Reflexes 0 +SGM     Examiner  Tu Fay MD             There is no immunization history on file for this patient. Hearing Screen:             Metabolic Screen:       CHD Oxygen Saturation Screening:          Assessment/Plan:     Active Problems:    Uterine contractions during pregnancy (2020)         Impression on admission:   @ 1249 Admission day,  39+4  week AGA female delivered by  to 35  yr  mom (O pos, GBS neg) with uneventful pregnancy, apgars 8/9, transitioning well. Mom ROM <1 hrs. VSS-AF, exam above. Regular nursery care. Mom plans to breast/bottle feed.   Tu Fay MD    Progress Note:    Impression on Discharge:   Discharge weight:    Wt Readings from Last 1 Encounters:   20 70.3 kg (155 lb)

## 2020-11-21 NOTE — PROGRESS NOTES
1545 Received care of mother in room, no distress, call bell in reach,  Oriented to room, notified to call for clot passage, heavy bleeding and assistance to the BR, family @ bedside  1600 assist to BR, pericare  It instructions given, scant lochia    1850 OOB to BR  Voided and pericare done no excessive bleeding  2300 BEDSIDE_VERBAL_RECORDED_WRITTEN: shift change report given to SIMÓN Calhounrn/ LAURO Schwabrn (oncoming nurse) by Yolanda Barton (offgoing nurse). Report given with DONALD, Hilda and MAR. Stable

## 2020-11-21 NOTE — PROGRESS NOTES
1015: Paged Dr Leonor Oscar for epidural.     1025: Dr. Leonor Oscar At bedside discussing epidural with patient. Patient verbalized understanding and agreement. Consent form Signed. 1030: Time out completed. 1034: Epidural catheter placed. 1030: Fentanyl vial handed to anesthesiologist for loading dose administration. 1035: Test dose. 1036: Load dose. 1037: Epidural connected and infusion started (see MAR). 1038: Patient lying down in bed.     1100: SVE 9/100/-1  Repositioned to right lateral.     1142: Saray CNSHANNAN at bedside   AROM moderate clear fluid  SVE complete    1147: Viable Baby girl delivered vaginally Nuchal x1     1154: Placenta delivered intact  Vicryl CT-1 2-0 for 2nd degree laceration    1200: Karly care done. Fundus firm at U. Small lochia    1215: fundus firm at u scant lochia. 1450: Pad changed, Voided 500 ml     1500: TRANSFER - OUT REPORT:    Verbal report given to Jessica Webb RN(name) on [facility]  being transferred to (unit) for routine progression of care       Report consisted of patients Situation, Background, Assessment and   Recommendations(SBAR). Information from the following report(s) SBAR, Kardex, Intake/Output, MAR and Recent Results was reviewed with the receiving nurse. Lines:   Peripheral IV 11/21/20 Left Wrist (Active)        Opportunity for questions and clarification was provided.       Patient transported with:   Registered Nurse

## 2020-11-21 NOTE — H&P
History & Physical    Name: Padmini Lazo MRN: [id number]  SSN: [id number]    YOB: 1985  Age: 37 y.o. Sex: female      Subjective:     Estimated Date of Delivery: 20  OB History    Para Term  AB Living   3 2           SAB TAB Ectopic Molar Multiple Live Births                    # Outcome Date GA Lbr Jaden/2nd Weight Sex Delivery Anes PTL Lv   3 Current            2 Para            1 Para                Ms. Иван Cole is admitted with pregnancy at 39w4d for labor and delivery. Prenatal course was normal per patient, however prenatal records are not available at this time for review. Please see prenatal records for details. She is a patient of [facility] [employer]. Past Medical History:   Diagnosis Date    Asthma      History reviewed. No pertinent surgical history. Social History     Occupational History    Not on file   Tobacco Use    Smoking status: Never Smoker    Smokeless tobacco: Never Used   Substance and Sexual Activity    Alcohol use: Not on file    Drug use: Never    Sexual activity: Yes     Family History   Family history unknown: Yes       Allergies   Allergen Reactions    Motrin [Ibuprofen] Anaphylaxis     Prior to Admission medications    Medication Sig Start Date End Date Taking? Authorizing Provider   PNV Comb #2-Iron-FA-Omega 3 29-1-400 mg cmpk Take  by mouth. Yes Provider, Historical   cyanocobalamin (Vitamin B-12) 100 mcg tablet Take 100 mcg by mouth daily. Yes Provider, Historical        Review of Systems: A comprehensive review of systems was negative except for that written in the History of Present Illness. Objective:     Vitals:  Vitals:    20 1021 20 1030 20 1050 20 1218   BP:  125/76 121/72 118/70   Pulse:  89 79 69   Resp:  16     Temp:  98.2 °F (36.8 °C)     Weight: 70.3 kg (155 lb)      Height: 5' 5\" (1.651 m)           Physical Exam:  Patient without distress.   Heart: Regular rate and rhythm or S1S2 present  Lung: clear to auscultation throughout lung fields, no wheezes, no rales, no rhonchi and normal respiratory effort  Abdomen: soft, nontender  Fundus: firm and non tender  Perineum: Delivered   Cervical Exam: Delivered   Lower Extremities: No erythema, warmth, tenderness or edema   Membranes:  Delivered   Fetal Heart Rate: Delivered       Prenatal Labs:   Lab Results   Component Value Date/Time    ABO/Rh(D) O POSITIVE 11/21/2020 09:40 AM       Impression/Plan:     Active Problems:    Uterine contractions during pregnancy (11/21/2020)         Plan: Admit for labor, delivery and postpartum care. Group B Strep unknown. Tramaine Ricci

## 2020-11-21 NOTE — ANESTHESIA PROCEDURE NOTES
Epidural Block    Start time: 11/21/2020 10:30 AM  End time: 11/21/2020 10:36 AM  Performed by: Ninfa Catalan MD  Authorized by:  Ninfa Catalan MD     Pre-Procedure  Indication: at surgeon's request, post-op pain management, procedure for pain and labor epidural    Preanesthetic Checklist: patient identified, risks and benefits discussed, anesthesia consent, site marked, patient being monitored, timeout performed and anesthesia consent      Epidural:   Patient position:  Seated  Prep region:  Lumbar  Prep: Chlorhexidine    Location:  L4-5    Needle and Epidural Catheter:   Needle Type:  Tuohy  Needle Gauge:  17 G  Injection Technique:  Loss of resistance using saline  Attempts:  1  Catheter Size:  20 G  Catheter at Skin Depth (cm):  11  Depth in Epidural Space (cm):  5  Events: no blood with aspiration, no cerebrospinal fluid with aspiration, no paresthesia and negative aspiration test    Test Dose:  Negative    Assessment:   Catheter Secured:  Tegaderm and tape  Insertion:  Uncomplicated  Patient tolerance:  Patient tolerated the procedure well with no immediate complications

## 2020-11-21 NOTE — PROGRESS NOTES
9927 Patient arrived via wheelchair at 39.4 weeks complaining of contractions every 2 minutes. Denies LOF, vaginal bleeding, or abnormal discharge. Reports good fetal movement. Taken to LD room 4 and oriented to area. 0930 SVE 7/100/-1. Admission orders received from Dr. Maximiliano Orr on Unit. 1005 Care of patient assumed by YARY Morales

## 2020-11-21 NOTE — ANESTHESIA PREPROCEDURE EVALUATION
Relevant Problems   No relevant active problems       Anesthetic History   No history of anesthetic complications            Review of Systems / Medical History  Patient summary reviewed, nursing notes reviewed and pertinent labs reviewed    Pulmonary            Asthma        Neuro/Psych   Within defined limits           Cardiovascular                  Exercise tolerance: >4 METS     GI/Hepatic/Renal  Within defined limits              Endo/Other  Within defined limits           Other Findings              Physical Exam    Airway  Mallampati: II  TM Distance: 4 - 6 cm  Neck ROM: normal range of motion   Mouth opening: Normal     Cardiovascular  Regular rate and rhythm,  S1 and S2 normal,  no murmur, click, rub, or gallop             Dental  No notable dental hx       Pulmonary  Breath sounds clear to auscultation               Abdominal  GI exam deferred       Other Findings            Anesthetic Plan    ASA: 2  Anesthesia type: epidural  Risk and benefits fully explained to the patient including bleeding, headache, nerve damage, infection, nausea, back pain, and hemodynamic changes. Patient understands and agrees to the procedure.     Post-op pain plan if not by surgeon: indwelling epidural catheter      Anesthetic plan and risks discussed with: Patient

## 2020-11-22 VITALS
WEIGHT: 155 LBS | HEIGHT: 65 IN | DIASTOLIC BLOOD PRESSURE: 71 MMHG | SYSTOLIC BLOOD PRESSURE: 120 MMHG | BODY MASS INDEX: 25.83 KG/M2 | RESPIRATION RATE: 16 BRPM | TEMPERATURE: 98.3 F | OXYGEN SATURATION: 100 % | HEART RATE: 94 BPM

## 2020-11-22 PROBLEM — O47.9 UTERINE CONTRACTIONS DURING PREGNANCY: Status: RESOLVED | Noted: 2020-11-21 | Resolved: 2020-11-22

## 2020-11-22 LAB
HCT VFR BLD AUTO: 31.7 % (ref 35–45)
HGB BLD-MCNC: 10.4 G/DL (ref 12–16)

## 2020-11-22 PROCEDURE — 85014 HEMATOCRIT: CPT

## 2020-11-22 PROCEDURE — 36415 COLL VENOUS BLD VENIPUNCTURE: CPT

## 2020-11-22 PROCEDURE — 74011250637 HC RX REV CODE- 250/637: Performed by: ADVANCED PRACTICE MIDWIFE

## 2020-11-22 PROCEDURE — 85018 HEMOGLOBIN: CPT

## 2020-11-22 RX ORDER — DOCUSATE SODIUM 100 MG/1
100 CAPSULE, LIQUID FILLED ORAL 2 TIMES DAILY
Qty: 60 CAP | Refills: 2 | Status: SHIPPED | OUTPATIENT
Start: 2020-11-22 | End: 2021-02-20

## 2020-11-22 RX ORDER — ACETAMINOPHEN 325 MG/1
650 TABLET ORAL
Qty: 60 TAB | Refills: 1 | Status: SHIPPED | OUTPATIENT
Start: 2020-11-22

## 2020-11-22 RX ADMIN — OXYCODONE HYDROCHLORIDE AND ACETAMINOPHEN 2 TABLET: 5; 325 TABLET ORAL at 13:59

## 2020-11-22 RX ADMIN — OXYCODONE HYDROCHLORIDE AND ACETAMINOPHEN 2 TABLET: 5; 325 TABLET ORAL at 07:27

## 2020-11-22 RX ADMIN — DOCUSATE SODIUM 50 MG AND SENNOSIDES 8.6 MG 1 TABLET: 8.6; 5 TABLET, FILM COATED ORAL at 08:35

## 2020-11-22 RX ADMIN — OXYCODONE HYDROCHLORIDE AND ACETAMINOPHEN 2 TABLET: 5; 325 TABLET ORAL at 01:12

## 2020-11-22 NOTE — ROUTINE PROCESS
0710: Bedside and Verbal shift change report given to Kylee Figueredo RN (oncoming nurse) by Earnest Resendiz RN (offgoing nurse). Report included the following information SBAR, Kardex, Procedure Summary, Intake/Output, MAR and Recent Results. 0730: Shift assessment complete. Patient denies headache, visual disturbances, and right epigastic pain at this time. Breath sounds clear bilaterally. Patient is passing gas, bowel sounds active, with last BM being 11/21/20. Fundus firm at U-1 with scant lochia, no clots noted on assessment. IV site removed. No edema in upper extremities, none in lower extremities. Patient free of clonus in lower extremities and denying any pain/tenderness behind knees or in calves. Patient rating pain 7/10 and Percocet pain medication and warm packs given at this time. Patient educated to notify nursing staff of: SOB, chest pain/heaviness, blurred vision, lightheadedness, increase in bleeding, and passing of clots, patient verbalized understanding. Fresh ice water given and no further needs expressed. 5864: Patient asked for stool softener. Pericolace given. No other needs expressed at this time. Patient's pain level decreased to 4/10.  
 
1000: Rounds completed. No needs at this time. 1125: Rounds completed. Fresh ice water given and warm packs given. 1359: Percocet given for cramping pain 7/10. No other needs at this time. 1500: Reassessment completed. Pain reassessment 5/10. Encouraged the use of warm packs. No further needs at this time. 1600: Discharge instructions reviewed with patient. Patient instructed to scheduled F/U appointment with OB in 6 weeks. Patient educated on prescribed medications. Educated patient to look out for s/s of stroke: face looks uneven, unable to move arms or arms move unevenly, or if experiencing slurred or non-existent speech, it is time to call 911 because time is brain.  Educated patient to call 911 if exhibiting s/s of a heart attack which include: chest discomfort, discomfort in other upper areas of the body, SOB, breaking out in a cold sweat, nausea, or lightheadedness because minutes matter. Educated patient on more common things to notify OB for: Temperature of 100.4 or above, chills, unusual discharge, discharge with a foul odor, pain or burning on urination, as these can be signs of infection. Educated to notify OB for severe abd pain, excessive bleeding (more than 1 pad/hour), large amount of clots, and any large golf ball size clots. Educated patient to also contact OB for pain/swelling/ redness in the calf or behind the knees and educated to not massage these areas if this occurs as this could indicate a blood clot. Educated patient that if there are other s/s that are out of her normal, to contact OB to make them aware. Patient educated to rest when baby rests, to gradually increase activity over the next 1-2 weeks, to not lift anything heavier than 8 pounds or baby for the first week, avoid driving for the first week, and to also limit stair use to about only 2-3 times a day for the first week. Patient educated that until approved by the OB, typically at the 6 week F/U appointment, to avoid anything inserted into the vagina, so no sex, douching, tampons, tub baths, pools, or hot tubs. Patient educated to eat foods high in nutritional value, adding foods high in fiber if having trouble with BM, continue taking prenatal vitamins, and to continue hydrating to help get body back on track. Educated patient on baby blues and PP depression. Patient educated that with baby blues, it is normal to feel happy one second and sad that next or find herself crying for no reason, but if having thoughts of harming herself or baby or if these feelings of sadness are lasting 2 weeks, it is time to notify OB.  Patient verbalized understanding of education above, allowed time for questions, no questions or concerns at this time. Patient given education packet of reviewed discharge instructions and referred patient to \"Mother and Zane Hutchins" book inside folder for more information regarding care for herself and . Patient discharged via wheelchair per protocol. Patient in stable condition. E-sign completed. Baby bands verified and instructed to keep band on until home, all other armbands removed and discarded in shredder.

## 2020-11-22 NOTE — DISCHARGE SUMMARY
Obstetrical Discharge Summary     Name: Swapnil Woody MRN: [id number]  SSN: [id number]    YOB: 1985  Age: 37 y.o. Sex: female      Admit Date: 2020    Discharge Date: 2020    Admitting Physician: Miranda Green MD     Attending Physician:  Angelic Maloney MD     Discharge Diagnoses:   Information for the patient's :  [address] [facility], Aisha Jackson [[id number]]   Delivery of a 3.595 kg female infant via Vaginal, Spontaneous on 2020 at 11:47 AM  by Anne Soliman. Apgars were 8  and 9 . Additional Diagnoses:   Problem List as of 2020 Date Reviewed: 2020          Codes Class Noted - Resolved    Uterine contractions during pregnancy ICD-10-CM: O62.2  ICD-9-CM: 661.20  2020 - Present            No results found for: RUBELLAEXT, GRBSEXT  Recent Labs     20  0245   [facility] Course: Normal hospital course following the delivery. Patient Instructions:   Current Discharge Medication List      START taking these medications    Details   acetaminophen (TYLENOL) 325 mg tablet Take 2 Tabs by mouth every four (4) hours as needed for Pain. Qty: 60 Tab, Refills: 1      docusate sodium (Colace) 100 mg capsule Take 1 Cap by mouth two (2) times a day for 90 days. Qty: 60 Cap, Refills: 2         CONTINUE these medications which have NOT CHANGED    Details   PNV Comb #2-Iron-FA-Omega 3 29-1-400 mg cmpk Take  by mouth.      cyanocobalamin (Vitamin B-12) 100 mcg tablet Take 100 mcg by mouth daily. Reference my discharge instructions. Follow-up Appointments   Procedures    FOLLOW UP VISIT Appointment in: 6 Weeks     Standing Status:   Standing     Number of Occurrences:   1     Order Specific Question:   Appointment in     Answer:   6 Weeks        Signed By:  Anne Soliman CNM     2020                       [facility] .

## 2020-11-22 NOTE — DISCHARGE INSTRUCTIONS

## 2020-11-22 NOTE — PROGRESS NOTES
2320 Bedside report received from Virgilio Benito RN . Pt. Stable. Needs addressed. Callbell within reach. 0200 Pt in nursery with baby at bedside for bath. No needs or concerns at this time. I have read and reviewed all charting and documentation completed by Rigoberto Lema RN.     8104 Bedside and Verbal shift change report given to Irving Rodriguez RN  (oncoming nurse) by ALEJANDRO Aguiar and Rigoberto Lema RN (offgoing nurse). Report included the following information SBAR, Kardex, Intake/Output, MAR and Recent Results.

## 2020-11-22 NOTE — PROGRESS NOTES
Problem: Pain  Goal: *Control of Pain  Outcome: Progressing Towards Goal     Problem: Vaginal Delivery: Postpartum Day 1  Goal: Medications  Outcome: Progressing Towards Goal  Goal: *Vital signs within defined limits  Outcome: Progressing Towards Goal  Goal: *Labs within defined limits  Outcome: Progressing Towards Goal  Goal: *Hemodynamically stable  Outcome: Progressing Towards Goal  Goal: *Optimal pain control at patient's stated goal  Outcome: Progressing Towards Goal  Goal: *Demonstrates progressive activity  Outcome: Progressing Towards Goal

## 2020-11-22 NOTE — PROGRESS NOTES
2320 Bedside report received from Davina Flores RN. Pt. Stable. Needs addressed. Callbell within reach. 0058 Pt. Joined at bedside by support person and baby. AAOx4. Vital signs stable. Will continue to monitor. Pain 7/10. Educated on pain management. Pain medication administered as ordered. Whiteboard updated. Educated on plan of care and signs and symptoms to report. No further questions on concerns at this time. Fundus firm at U -1, scant rubra lochia. No clots noted. Assessment complete. Callbell within reach. Bed in lowest position. 0294 Pt rated pain 1/10. Bed in lowest position. Callbell within reach. 0427 Rounding complete. Pt sitting in bed. Bed in lowest position. Callbell within reach. 3854 Rounding complete. Pt sleeping with bed in lowest position. Callbell within reach. Feedings and I/O updated. 0710 Bedside shift change report given to Saw Booth RN (oncoming nurse) by Crispin Han RN and Anny Jean Baptiste RN (offgoing nurse). Report included the following information SBAR, Kardex and MAR.

## 2020-11-22 NOTE — PROGRESS NOTES
Post-Partum Day Number 1 Progress Note    Chela Veliz     Assessment:   Hospital Problems  Date Reviewed: 2020          Codes Class Noted POA    Uterine contractions during pregnancy ICD-10-CM: O62.2  ICD-9-CM: 661.20  2020 Unknown            Doing well, post partum day 1    Plan:  1. Continue routine postpartum and perineal care as well as maternal education. 2. Breastfeeding support   3. Discharge home today or tomorrow pending peds approval. F/U with OBGYN in 6 weeks. Information for the patient's :  Shana Coughlin [[id number]]   Vaginal, Spontaneous    Patient doing well without significant complaint. Voiding without difficulty, normal lochia. Pain well controlled. Breast and bottlefeeding       Current Facility-Administered Medications   Medication Dose Route Frequency    lactated Ringers infusion  150 mL/hr IntraVENous CONTINUOUS    sodium chloride (NS) flush 5-40 mL  5-40 mL IntraVENous Q8H       Vitals:  Visit Vitals  /79 (BP 1 Location: Right arm, BP Patient Position: At rest;Sitting)   Pulse 74   Temp 98.1 °F (36.7 °C)   Resp 17   Ht 5' 5\" (1.651 m)   Wt 70.3 kg (155 lb)   SpO2 100%   Breastfeeding Unknown   BMI 25.79 kg/m²     Temp (24hrs), Av.1 °F (36.7 °C), Min:98 °F (36.7 °C), Max:98.2 °F (36.8 °C)        Exam:   Patient without distress. Abdomen soft, fundus firm, nontender                Perineum with normal lochia noted. Lower extremities are negative for swelling, cords or tenderness.     Labs:     Lab Results   Component Value Date/Time    WBC 10.7 2020 09:40 AM    WBC 7.3 06/10/2013 04:13 PM    HGB 10.4 (L) 2020 02:45 AM    HGB 12.0 2020 09:40 AM    HGB 13.8 06/10/2013 04:13 PM    HCT 31.7 (L) 2020 02:45 AM    HCT 36.6 2020 09:40 AM    HCT 40.0 06/10/2013 04:13 PM    PLATELET 632  09:40 AM    PLATELET 917 9217 04:13 PM       Recent Results (from the past 24 hour(s)) CBC WITH AUTOMATED DIFF    Collection Time: 11/21/20  9:40 AM   Result Value Ref Range    WBC 10.7 4.6 - 13.2 K/uL    RBC 4.13 (L) 4.20 - 5.30 M/uL    HGB 12.0 12.0 - 16.0 g/dL    HCT 36.6 35.0 - 45.0 %    MCV 88.6 74.0 - 97.0 FL    MCH 29.1 24.0 - 34.0 PG    MCHC 32.8 31.0 - 37.0 g/dL    RDW 19.6 (H) 11.6 - 14.5 %    PLATELET 540 516 - 614 K/uL    MPV 10.2 9.2 - 11.8 FL    NEUTROPHILS 79 (H) 40 - 73 %    LYMPHOCYTES 16 (L) 21 - 52 %    MONOCYTES 5 3 - 10 %    EOSINOPHILS 0 0 - 5 %    BASOPHILS 0 0 - 2 %    ABS. NEUTROPHILS 8.4 (H) 1.8 - 8.0 K/UL    ABS. LYMPHOCYTES 1.8 0.9 - 3.6 K/UL    ABS. MONOCYTES 0.5 0.05 - 1.2 K/UL    ABS. EOSINOPHILS 0.0 0.0 - 0.4 K/UL    ABS.  BASOPHILS 0.0 0.0 - 0.1 K/UL    DF AUTOMATED     TYPE & SCREEN    Collection Time: 11/21/20  9:40 AM   Result Value Ref Range    Crossmatch Expiration 11/24/2020,2359     ABO/Rh(D) Nickie Sanabria POSITIVE     Antibody screen NEG    HEMOGLOBIN    Collection Time: 11/22/20  2:45 AM   Result Value Ref Range    HGB 10.4 (L) 12.0 - 16.0 g/dL   HEMATOCRIT    Collection Time: 11/22/20  2:45 AM   Result Value Ref Range    HCT 31.7 (L) 35.0 - 45.0 %

## 2020-11-24 NOTE — ADT AUTH CERT NOTES
Hello, Below I have provided the diagnosis code for this patient. Thanks Vaginal delivery  ICD-10-CM: O80  
ICD-9-CM: 717    11/22/2020 - Present  No   
 Entered by Roosevelt Mai CNM   
RESOLVED: Uterine contractions during pregnancy  ICD-10-CM: O62.2 ICD-9-CM: 661.20    11/21/2020 - 11/22/2020  Yes Entered by Roxy Roach MD   
 Resolved by  Roosevelt Mai CNM